# Patient Record
(demographics unavailable — no encounter records)

---

## 2024-12-03 NOTE — DISCUSSION/SUMMARY
[de-identified] : The underlying pathophysiology was reviewed with the patient. XR films were reviewed with the patient. Discussed at length the nature of the patients condition. Their right elbow symptoms appear secondary to lateral epicondylitis. The patient and I discussed her options regarding treatment.   Patient was advised to take OTC medications and topical analgesic for pain management. Discussed the usage of a carpel tunnel brace to reduce inflammation. Gentle range of motion and strengthening exercises were encouraged, as tolerated by pain. The patient was given the option of a cortisone injections if her symptoms acutely worsen.  All questions answered, understanding verbalized. Patient in agreement with plan of care.  If the patient begins to experience any changes or severe exacerbation of her symptoms, she should reach out to me as soon as possible. Otherwise, she should return to me as needed.

## 2024-12-03 NOTE — ADDENDUM
[FreeTextEntry1] : I, Newton Aguilar Jr, acted solely as a scribe for Dr. Corey Mejia on this date 12/03/2024  All medical record entries made by the Scribe were at my, Dr. Corey Mejia, direction and personally dictated by me on 12/03/2024 . I have reviewed the chart and agree that the record accurately reflects my personal performance of the history, physical exam, assessment and plan. I have also personally directed, reviewed, and agreed with the chart.

## 2024-12-03 NOTE — PHYSICAL EXAM
[de-identified] : Patient is WDWN, alert, and in no acute distress. Breathing is unlabored. She is grossly oriented to person, place, and time.   Right Elbow: Inspection/Palpation: Point tenderness at ECRB insertion into lateral epicondyle.  Range of Motion: Pain elicited with resisted wrist extension with elbow fully extended, resisted extension of the long fingers, maximal flexion of the wrist, passive wrist flexion in pronation.  Strength: Flexion and extension 5/5  Stability: No joint instability on provocative testing. No skin lesions or discoloration.    [de-identified] : AP, lateral and oblique views of the RIGHT elbow were obtained today and revealed normal alignment. No abnormality noted.

## 2024-12-03 NOTE — HISTORY OF PRESENT ILLNESS
[de-identified] : Pt is a 63 y/o female c/o right elbow pain x 3 weeks.  She states that prior to the elbow pain, she was having mild intermittent pain in her forearm.  The elbow pain is severe.  It is tender to touch.  It hurts to lift anything.  She has pain when her wrist is in a flexed position.  She has pain with supination.  Denies numbness or tingling.